# Patient Record
Sex: MALE | Race: WHITE
[De-identification: names, ages, dates, MRNs, and addresses within clinical notes are randomized per-mention and may not be internally consistent; named-entity substitution may affect disease eponyms.]

---

## 2018-06-15 ENCOUNTER — HOSPITAL ENCOUNTER (OUTPATIENT)
Dept: HOSPITAL 96 - M.CRD | Age: 43
End: 2018-06-15
Attending: INTERNAL MEDICINE
Payer: COMMERCIAL

## 2018-06-15 DIAGNOSIS — R00.2: Primary | ICD-10-CM

## 2018-06-15 DIAGNOSIS — R07.2: ICD-10-CM

## 2018-06-22 NOTE — TST
[place] [address]
Phone:  [phone]                     TREADMILL STRESS TEST         
_______________________________________________________________________________
 
Name:         CHARLES ZIEGLER               Room:                     REG CLI
M.R.#:    [id number]     Account #:     [id number]  
Admission:    06/15/18    Attend Phys:   Charles Fernandez,
Discharge:                Date of Birth: [birth year]  
Date of Service: 06/15/18 1626  Report #:      [id number]
        [id number]     
_______________________________________________________________________________
THIS REPORT FOR:  //name//                      
 
CC: Rick Fernandez MD
 
DATE OF SERVICE:  06/15/2018
 
 
INDICATIONS:  Exercise stress test was requested in this patient with history of
palpitations.
 
PROCEDURE IN DETAIL:  The patient was exercised on a Bucky protocol without
imaging on a standard treadmill.
 
RESULTS:  The patient had a pretest heart rate of 76 and blood pressure 144/100.
 The patient was able to exercise for 11 minutes and 54 seconds achieving a peak
heart rate of 158, which is greater than 90% of maximum predicted heart rate for
the patient's age.  Peak blood pressure 198/74.  In recovery, the patient had a
heart rate of 84 and blood pressure 178/94.  The patient denied chest pain with
exercise terminated because of fatigue.  The patient's resting ECG showed a
normal sinus rhythm with no ST or T-wave change noted at baseline.  With
exercise, there was no arrhythmia noted other than a rare PVC.  At peak
exercise, the patient was noted to have J-point depression, but there was no
significant ST-segment depression noted at 80 milliseconds after the J-point.
 
IMPRESSION:
1.  Excellent exercise tolerance.
2.  No chest pain with exercise.
3.  No ischemic ST-segment changes noted with exercise.
4.  Negative exercise stress test for myocardial ischemia.
5.  This study is felt to represent a low risk for future cardiac events.
 
 
 
 
 
 
 
 
 
 
 
  <ELECTRONICALLY SIGNED>
                                           By: Richard Oakley MD, FACC      
  06/22/18     [id number]
D: 06/15/18 1626   _____________________________________
T: 06/16/18 0220   Richard Oakley MD, FACC        /nt

## 2020-08-22 ENCOUNTER — HOSPITAL ENCOUNTER (EMERGENCY)
Dept: HOSPITAL 96 - M.ERS | Age: 45
Discharge: HOME | End: 2020-08-22
Payer: COMMERCIAL

## 2020-08-22 VITALS — HEIGHT: 72 IN | BODY MASS INDEX: 24.38 KG/M2 | WEIGHT: 180.01 LBS

## 2020-08-22 VITALS — DIASTOLIC BLOOD PRESSURE: 99 MMHG | SYSTOLIC BLOOD PRESSURE: 177 MMHG

## 2020-08-22 DIAGNOSIS — F12.90: ICD-10-CM
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
## 2020-08-22 DIAGNOSIS — S61.411A: Primary | ICD-10-CM
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
## 2020-08-22 DIAGNOSIS — Z79.899: ICD-10-CM
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
## 2020-08-22 DIAGNOSIS — I10: ICD-10-CM
